# Patient Record
(demographics unavailable — no encounter records)

---

## 2025-05-05 NOTE — HISTORY OF PRESENT ILLNESS
[Improved Health] : Improved health [Quality of Life] : Quality of life [FreeTextEntry2] : 170 [FreeTextEntry3] : 201 [FreeTextEntry1] : MING AGUIRRE is a 42 year old who comes in for a dietary/weight loss consultation. Past medical history is significant for obesity, gallstones. Patient's vital signs were reviewed. Her reported height is 5'4 Today's weight is 201 . BMI is 34.50. A detailed weight history was obtained. THis is the heaviest the patient has been.  Medication: Vitamins   The patient has tried numerous weight loss programs including self-directed and physician supervised diets and self-directed exercise programs. She has lost greater than 10 pounds on more than one occasion, however, has experienced weight regain despite her best efforts with healthy diet and exercise.  LABS - DATE: HgA1C:  DIETING HISTORY Prior Diets: cutting calories, intermittent fasting History of Bariatric Surgery: No If yes, what Bariatric Surgery: Interest in Bariatric surgery:No   History of Weight Loss Medications: None If yes, what Medications: Complications & Side Effects of Anti-Obesity Medications:   LIFESTYLE: Contraception: none Sleep: 8-10 hours Alcohol: none Exercise : peloton daily, started gym Occupation: RN   NUTRITION: eat out most  Breakfast: occasional skips, at hospital eggs, oatmeal, Lunch: salad w/ chicken, salmon, rice (1 cup) when at home,  Dinner: rice, beans, meat, vegetables Snacks:carrots, celery sticks w/ peanut butter. sweet cravings w/ menses Drinks: water   I have instructed the patient to follow a 1200 calories meal plan emphasizing low saturated fat sources with moderate amount of sodium as well. Information on fast food eating was supplied and additional information on low fat eating was also supplied. Suggestions of meals and snacks were discussed with the patient in great detail. We reviewed the efforts of weight reduction identifying 3500 calories in pound of body fat and the need to gradually and sloqly chip away at this number on a long term basis for weight reduction. It is a lifestyle change. WE discussed the need to reduce calories for what her current patterns are and to hopefully increase physical activity as well.    Patient reports old habits as large portion sizes and eating past the point of satisfied as obstacles to weight control. Patient admits to eating quickly, skipping meals, and poor food choices. Discussed the importance of a balanced, healthy diet of nourishing foods and consistent meal pattern for long-term wt loss success and health maintenance. Pt educated on eating 4-6 small meals per day, that are high in protein for hunger regulation.    Her physical activity is minimal at this time. Recommendations given to the patient to increase the intensity and the duration of her physical activity with the goal of 30mins five times a week. Recommend the patient to reduce calories by 500 daily to support a weight loss of 1 pound a week. This translated into a 1200 calorie plan. I encouraged the patient to keep food records in order to better track calorie consumed. I recommended low fat selections and especially those that are lower in saturated fats. Emphasis would be placed on monitoring portions of meat and having more moderate snacks between meal as well.      Will try Course of : Patient with BMI of 34.50. We discussed the importance of weight loss. Discussed the need to incorporate 30 g of protein with each meal to help with muscle building especially since she is working out. Recommended meeting with the RD to discuss dietary changes she can work on.   Labs to be done to r/o additional secondary causes of weight gain. Pending results will discuss medication options to help supplement patient's lifestyle modification efforts.  Emotional support provided. All of her questions were answered.   zepbound Rx sent. Risks include but are not limited to nausea, vomiting, constipation, diarrhea, and Gi upset. Contraindications include Pancreatitis, MENS type 2 and medullary thyroid cancer, and pregnancy. Pt. verbalize understanding and wishes to proceed with medical therapy. Instructions for use provided via office demonstration. Discussed shortage and insurance limitations.